# Patient Record
Sex: FEMALE | Race: WHITE | NOT HISPANIC OR LATINO | Employment: OTHER
[De-identification: names, ages, dates, MRNs, and addresses within clinical notes are randomized per-mention and may not be internally consistent; named-entity substitution may affect disease eponyms.]

---
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
## 2022-05-12 ENCOUNTER — HOSPITAL ENCOUNTER (EMERGENCY)
Facility: HOSPITAL | Age: 22
Discharge: HOME | End: 2022-05-12
Attending: EMERGENCY MEDICINE | Admitting: EMERGENCY MEDICINE
Payer: MEDICAID

## 2022-05-12 VITALS
RESPIRATION RATE: 20 BRPM | TEMPERATURE: 98.6 F | WEIGHT: 180 LBS | OXYGEN SATURATION: 95 % | HEART RATE: 89 BPM | SYSTOLIC BLOOD PRESSURE: 120 MMHG | DIASTOLIC BLOOD PRESSURE: 69 MMHG | BODY MASS INDEX: 29.95 KG/M2

## 2022-05-12 DIAGNOSIS — G40.919 BREAKTHROUGH SEIZURE (H): ICD-10-CM

## 2022-05-12 PROCEDURE — 99282 EMERGENCY DEPT VISIT SF MDM: CPT

## 2022-05-12 NOTE — ED NOTES
Bed: JNED-06  Expected date: 5/12/22  Expected time: 12:36 PM  Means of arrival:   Comments:  Seizure/15mg valium

## 2022-05-12 NOTE — ED PROVIDER NOTES
EMERGENCY DEPARTMENT ENCOUNTER      NAME: Cate Salazar  AGE: 21 year old female  YOB: 2000  MRN: [id number]  EVALUATION DATE & TIME: 2022  1:04 PM    PCP: Radha Barlow    ED PROVIDER: Rosalee Brock M.D.      Chief Complaint   Patient presents with     Seizures         FINAL IMPRESSION:  1. Breakthrough seizure (H)          ED COURSE & MEDICAL DECISION MAKIN:22 PM I met with patient for initial interview and exam.   1:37 PM We discussed plans for discharge including supportive cares, symptomatic treatment, outpatient follow up, and reasons to return to the emergency department.     ED Course as of 22 1351   Thu May 12, 2022   1331 Pt s/p witnessed tonic clonic breakthrough seizure without fall, like prior breakthrough seizures. Pt reassuringly with normal VS and at baseline mental status per mother at bedside who met patient in the ED, transported here by EMS per her care plan from day facility /program when they were not able to get ahold of her mother. Mother of patient at bedside and staff engaged in shared decision making conversation and all comfortable to follow her care plan, which is to discharge with close PMD f/unit(s) and neurology follow up at baseline wihtout labs form the ED with recurrent breakthrough isolated seizure epsiode without prolonged AMS or recent illness, medication changes or trauma. Patient discharged after being provided with extensive anticipatory guidance and given return precautions, importance of PMD follow-up emphasized.        Pertinent Labs & Imaging studies reviewed. (See chart for details)    N95 worn  A face shield was worn also  COVID PPE      At the conclusion of the encounter I discussed the results of all of the tests and the disposition. The questions were answered. The patient or family acknowledged understanding and was agreeable with the care plan.     MEDICATIONS GIVEN IN THE EMERGENCY:  Medications - No data to display    NEW  "PRESCRIPTIONS STARTED AT TODAY'S ER VISIT  New Prescriptions    No medications on file          =================================================================    HPI  Limited due to autism    Cate Salazar is a 21 year old female with PMHx of autism with eplilepsy who presents to the ED today via EMS with seizures.    Per triage note, patient came from a facility (adult  program) after a 2 minute convulsive granmal seizure. Her parents did not answer the phone so she was brought to the ED. No longer seizing.    Per facility staff, patient was sitting at her desk eating lunch. She suddenly started yelling and started leaning to the side. Staff lowered her to the ground and she proceeded to have a 2 minute granmal seizure with 9 minutes of post-ictal \"snoring\".     Per mother, patient usually has a seizure about x1 a month. No new medication changes. After her seizures, she usually sleeps for about 6 hours which is consistent to the patient's current condition. Patient was given 15 mg of buccal diazepam and then an additional 5 mg. Patient has a seizure plan and usually goes home from the adult  if she has one.      REVIEW OF SYSTEMS   Unable to obtain: Limited due to autism    PAST MEDICAL HISTORY:  Past Medical History:   Diagnosis Date     Autistic disorder, current or active state     aggression, self harming, ED visits, in the process of finding meds that work per Dad     Oral fistula      Seizures (H)      Tourette's        PAST SURGICAL HISTORY:  Past Surgical History:   Procedure Laterality Date     NO HISTORY OF SURGERY       REPAIR FISTULA ORAL Right 8/20/2019    Procedure: Closure of Right Oral Antral Fistula with Buccal Fat Pad and Placement of Surigcal splint;  Surgeon: Sergio Rao DDS;  Location: [facility] OR       CURRENT MEDICATIONS:    acetaminophen (TYLENOL) 325 MG tablet  carbamide peroxide (DEBROX) 6.5 % otic solution  chlorhexidine (PERIDEX) 0.12 % solution  COD LIVER OIL OR " OIL  diazepam (DIASTAT ACUDIAL) 10 MG GEL rectal kit  diazepam (VALIUM) 2 MG tablet  guanFACINE (TENEX) 1 MG tablet  HYDROcodone-acetaminophen (NORCO) 5-325 MG tablet  ibuprofen (ADVIL/MOTRIN) 600 MG tablet  lacosamide (VIMPAT) 200 MG TABS tablet  loratadine (CLARITIN) 10 MG tablet  LORazepam (ATIVAN) 0.5 MG tablet  LORazepam (ATIVAN) 2 MG tablet  medical cannabis (Patient's own supply)  Multiple Vitamin (MULTIVITAMIN OR)  Omega-3 Fatty Acids (OMEGA 3 PO)  ondansetron (ZOFRAN-ODT) 4 MG ODT tab  ORDER FOR DME  oxymetazoline (AFRIN) 0.05 % nasal spray  polyethylene glycol (MIRALAX/GLYCOLAX) powder  QUEtiapine (SEROQUEL) 25 MG tablet  risperiDONE (RISPERDAL) 0.5 MG tablet  traZODone (DESYREL) 50 MG tablet        ALLERGIES:  Allergies   Allergen Reactions     Olanzapine Other (See Comments)     Seizures        Amoxicillin Other (See Comments)     Seizures, swelling, and hives     Tenex [Guanfacine]      seizures       FAMILY HISTORY:  Family History   Problem Relation Age of Onset     Family History Negative No family hx of        SOCIAL HISTORY:   Social History     Socioeconomic History     Marital status: Single   Tobacco Use     Smoking status: Never Smoker     Smokeless tobacco: Never Used     Tobacco comment: dad smokes   Substance and Sexual Activity     Alcohol use: No     Drug use: No     Sexual activity: Never       VITALS:  Patient Vitals for the past 24 hrs:   BP Temp Temp src Pulse Resp SpO2 Weight   05/12/22 1324 -- -- -- 89 -- 95 % --   05/12/22 1307 -- 98.6  F (37  C) Temporal -- -- -- --   05/12/22 1256 120/69 -- -- (!) 124 20 95 % 81.6 kg (180 lb)       PHYSICAL EXAM    GENERAL: Awake, alert.  In no acute distress.   HEENT: Normocephalic, atraumatic.  Pupils equal, round and reactive.  Conjunctiva normal.  EOMI.  NECK: No stridor or apparent deformity.  CARDIO: Regular rate and rhythm.  No significant murmur, rub or gallop.  Radial pulses strong and symmetrical.  EXTREMITIES: No lower extremity  swelling or edema.    NEURO: Alert and oriented to person, place and time.  Cranial nerves grossly intact.  No focal motor deficit.  PSYCH: Normal mood and affect  SKIN: No rashes              I, Paz Key, am serving as a scribe to document services personally performed by Dr. Rosalee Brock based on my observation and the provider's statements to me. I, Rosalee Brock MD attest that Paz Key is acting in a scribe capacity, has observed my performance of the services and has documented them in accordance with my direction.       Rosalee Brock MD  05/12/22 2166

## 2022-05-12 NOTE — ED TRIAGE NOTES
Pt arrives to ED via [facility]. Per medics, pt had a witnessed 2 minute seizure. Pt was helped to the ground and had no trauma. No head strike per staff. Pt comes with staff from [facility] today as parents were not able to be reached.     Pt has history of Autism and is acting at baseline per [facility] staff. Pt is hypersensitive to BP cuff as well as sounds.      Triage Assessment     Row Name 05/12/22 125       Triage Assessment (Adult)    Airway WDL WDL       Respiratory WDL    Respiratory WDL WDL       Skin Circulation/Temperature WDL    Skin Circulation/Temperature WDL WDL       Cognitive/Neuro/Behavioral WDL    Level of Consciousness alert    Arousal Level opens eyes spontaneously    Mood/Behavior anxious;excitable       Pupils (CN II)    Pupil PERRLA yes       Greene Coma Scale    Best Eye Response 4-->(E4) spontaneous    Best Motor Response 4-->(M4) withdraws from pain    Best Verbal Response 3-->(V3) inappropriate words    Greene Coma Scale Score 11

## (undated) DEVICE — SU VICRYL 3-0 X-1 27" UND J458H

## (undated) DEVICE — SOL NACL 0.9% IRRIG 1000ML BOTTLE 2F7124

## (undated) DEVICE — TOOTHBRUSH ADULT NON STERILE MDS136850

## (undated) DEVICE — LIGHT HANDLE X1 31140133

## (undated) DEVICE — GLOVE PROTEXIS POWDER FREE 8.5 ORTHOPEDIC 2D73ET85

## (undated) DEVICE — BLADE KNIFE SURG 15 371115

## (undated) DEVICE — SPONGE RAY-TEC 4X8" 7318

## (undated) DEVICE — PAD CHUX UNDERPAD 23X24" 7136

## (undated) DEVICE — TAPE CLOTH 2" CARDINAL 3TRCL02

## (undated) DEVICE — LINEN TOWEL PACK X6 WHITE 5487

## (undated) DEVICE — SU VICRYL 4-0 PC-3 18" UND J845G

## (undated) DEVICE — SYR EAR BULB 3OZ 0035830

## (undated) DEVICE — SOL WATER IRRIG 1000ML BOTTLE 2F7114

## (undated) DEVICE — NDL 25GA 2"  8881200441

## (undated) DEVICE — ESU NDL COLORADO MICRO E1651

## (undated) DEVICE — BLADE KNIFE SURG 10 371110

## (undated) DEVICE — SUCTION TIP YANKAUER W/O VENT K86

## (undated) DEVICE — PREP POVIDONE IODINE SOLUTION 10% 4OZ

## (undated) DEVICE — PREP SKIN SCRUB TRAY 4461A

## (undated) DEVICE — CAST PADDING 3" UNSTERILE 9043

## (undated) DEVICE — BLADE KNIFE SURG 11 371111

## (undated) DEVICE — SPONGE SURGIFOAM 12 1972

## (undated) DEVICE — DRAPE STERI TOWEL SM 1000

## (undated) DEVICE — SU CHROMIC 3-0 FS-2 27" 636

## (undated) DEVICE — BRUSH SURGICAL EZ SCRUB PLAIN 371603

## (undated) DEVICE — ESU GROUND PAD ADULT W/CORD E7507

## (undated) DEVICE — PACK NEURO MINOR UMMC SNE32MNMU4

## (undated) DEVICE — LINEN TOWEL PACK X5 5464

## (undated) RX ORDER — PROPOFOL 10 MG/ML
INJECTION, EMULSION INTRAVENOUS
Status: DISPENSED
Start: 2019-08-20

## (undated) RX ORDER — LIDOCAINE HYDROCHLORIDE 20 MG/ML
INJECTION, SOLUTION EPIDURAL; INFILTRATION; INTRACAUDAL; PERINEURAL
Status: DISPENSED
Start: 2019-08-20

## (undated) RX ORDER — EPHEDRINE SULFATE 50 MG/ML
INJECTION, SOLUTION INTRAMUSCULAR; INTRAVENOUS; SUBCUTANEOUS
Status: DISPENSED
Start: 2019-08-20

## (undated) RX ORDER — CLINDAMYCIN PHOSPHATE 900 MG/50ML
INJECTION, SOLUTION INTRAVENOUS
Status: DISPENSED
Start: 2019-08-20

## (undated) RX ORDER — BUPIVACAINE HYDROCHLORIDE AND EPINEPHRINE 5; 5 MG/ML; UG/ML
INJECTION, SOLUTION EPIDURAL; INTRACAUDAL; PERINEURAL
Status: DISPENSED
Start: 2019-08-20

## (undated) RX ORDER — CHLORHEXIDINE GLUCONATE ORAL RINSE 1.2 MG/ML
SOLUTION DENTAL
Status: DISPENSED
Start: 2019-08-20

## (undated) RX ORDER — KETAMINE HYDROCHLORIDE 50 MG/ML
INJECTION, SOLUTION INTRAMUSCULAR; INTRAVENOUS
Status: DISPENSED
Start: 2019-08-20

## (undated) RX ORDER — FENTANYL CITRATE 50 UG/ML
INJECTION, SOLUTION INTRAMUSCULAR; INTRAVENOUS
Status: DISPENSED
Start: 2019-08-20

## (undated) RX ORDER — OXYMETAZOLINE HYDROCHLORIDE 0.05 G/100ML
SPRAY NASAL
Status: DISPENSED
Start: 2019-08-20

## (undated) RX ORDER — HYDROMORPHONE HYDROCHLORIDE 1 MG/ML
INJECTION, SOLUTION INTRAMUSCULAR; INTRAVENOUS; SUBCUTANEOUS
Status: DISPENSED
Start: 2019-08-20

## (undated) RX ORDER — DEXAMETHASONE SODIUM PHOSPHATE 4 MG/ML
INJECTION, SOLUTION INTRA-ARTICULAR; INTRALESIONAL; INTRAMUSCULAR; INTRAVENOUS; SOFT TISSUE
Status: DISPENSED
Start: 2019-08-20

## (undated) RX ORDER — KETOROLAC TROMETHAMINE 30 MG/ML
INJECTION, SOLUTION INTRAMUSCULAR; INTRAVENOUS
Status: DISPENSED
Start: 2019-08-20

## (undated) RX ORDER — ONDANSETRON 2 MG/ML
INJECTION INTRAMUSCULAR; INTRAVENOUS
Status: DISPENSED
Start: 2019-08-20

## (undated) RX ORDER — PHENYLEPHRINE HCL IN 0.9% NACL 1 MG/10 ML
SYRINGE (ML) INTRAVENOUS
Status: DISPENSED
Start: 2019-08-20